# Patient Record
Sex: FEMALE | Race: WHITE
[De-identification: names, ages, dates, MRNs, and addresses within clinical notes are randomized per-mention and may not be internally consistent; named-entity substitution may affect disease eponyms.]

---

## 2016-12-31 NOTE — NUR
REC'D TO ROOM 2215 FROM ER DEPT A 50 Y/O W/FE PER SERVICES DR. MCKINNON WITH DX
RLL PNEUMONIA. ALLERGIC TO SULFA. IV PATENT RT HAND SALINE LOCK. O2 ON 2L/M
PER NC. ASSESSMENT AS PER ADMIT PACKET.

## 2017-01-01 NOTE — NUR
ASSESSMENT AS PER FLOWSHEET. IV PATENT RT HAND OF NS AT 50CC'S/HR SITE CLEAR.
PT C/O OF PAIN ANTIBIOTIC INFUSING AT SITE. CHECKED FOR PLACEMENT GOOD BLOOD
RETURN NOTED. CHECKED RESULTS OF XRAY PREVIOUSLY DONE ON WRIST. STATES NO
FRACTURE NOTED.

## 2017-01-01 NOTE — NUR
PATIENT RECEIVED ALERT IN RIGHT LATERAL POSITION. NO SIGNS OF DISTRESS NOTED.
SIDE RAILS UP X2. BED IN LOW POSITION. CALL LIGHT IN REACH. FAMILY AT BEDSIDE.

## 2017-01-01 NOTE — NUR
PT CRYING KLONOPIN GIVEN EARLY FOR ANXIETY. FRIEND AND FAMILY IN ROOM. IV
SALINE LOCKED SO PATIENT COULD TAKE A SHOWER. FRIEND HELPED.

## 2017-01-01 NOTE — NUR
PATIENT ALERT IN BED VISITING WITH GUESTS. NO SIGNS OF DISTRESS NOTED. DENIES
NEEDS. SIDE RAILS UP X2. BED IN LOW POSITION. CALL LIGHT IN REACH.

## 2017-01-01 NOTE — NUR
PATIENT ALERT IN BED. NO SIGNS OF DISTRESS NOTED. SCHEDULED MEDICATION
ADMINISTERED AS WELL AS PRN PERCOCET FOR PAIN 7/10. DENIES FURTHER NEEDS. SIDE
RAILS UP X2. BED IN LOW POSITION. CALL LIGHT IN REACH.

## 2017-01-02 NOTE — NUR
IV TO RIGHT FOREARM INFILTRATED. IV D/C WITH CATH TIP INTACT. MOTHER WANTING
PATIENT TO HAVE CENTRAL LINE. WILL SPEAK WITH PHYSICIAN.

## 2017-01-02 NOTE — NUR
PATIENT IN MID DIAZ POSITION RESTING WITH EYES CLOSED. RESPIRATIONS EVEN AND
UNLABORED. SIDE RAILS UP X2. BED IN LOW POSITION. CALL LIGHT IN REACH. GUEST
AT BEDSIDE.

## 2017-01-02 NOTE — NUR
22 GAUGE IV SITED TO RIGHT FOREARM X1 ATTEMPT. FLUSHES EASY WITH BRISK BLOOD
RETURN PRESENT. SECURED WITH TAPE AND TEGADERM. WELL TOLERATED.

## 2017-01-02 NOTE — NUR
PATIENT IN BED RESTING QUIETLY. NO SIGNS OF DISTRESS NOTED. SCHEDULED
MEDICATION ADMINISTERED. IV TO RIGHT FOREARM PATENT. FLUSHES EASY. NO REDNESS
OR INFLAMMATION NOTED TO SIDE. MOTHER AT BEDSIDE. SIDE RAILS UP X2. BED IN LOW
POSITION. CALL LIGHT IN REACH.

## 2017-01-02 NOTE — NUR
PATIENT ALERT IN BED. C/O PAIN TO RIGHT HAND/WRIST 9/10. 1 TAB PERCOCET
ADMINISTERED. ICE PACK PROVIDED. ELEVATED ON PILLOW. DENIES FURTHER NEEDS.
FAMILY PRESENT. SIDE RAILS UP X2. BED IN LOW POSITION. CALL LIGHT IN REACH.

## 2017-01-02 NOTE — NUR
PATIENT RECEIVED IN LOW DIAZ POSITION RESTING WITH EYES CLOSED. RESPIRATIONS
EVEN AND UNLABORED. SIDE RAILS UP X2. BED IN LOW POSITION. CALL LIGHT IN
REACH.

## 2017-01-02 NOTE — NUR
PATIENT ALERT IN HIGH DIAZ POSITION EATING BREAKFAST. TOLERATING WELL.
SCHEDULED MEDICATION ADMINISTERED. REFUSES TO HAVE IV RESITED. STATES RIGHT
ARM HURTS TOO BAD, AND PATIENT IS RESERVE LEFT ARM. SIDE RAILS UP X2. BED IN
LOW POSITION. CALL LIGHT IN REACH.

## 2017-01-02 NOTE — NUR
PATIENT IN HIGH DIAZ POSITION RESTING WITH EYES CLOSED. RESPIRATIONS EVEN
AND UNLABORED. SIDE RAILS UP X2. BED IN LOW POSITION. CALL LIGHT IN REACH.

## 2017-01-02 NOTE — NUR
Patient Name: ROCKY ADKINS                                   Admission
Status: ER
Accout number: [id number]                            Admission Date:
2016
: 1965                                                      Admission
Diagnosis:
Attending: EUSEBIA                                              Current
LOS:  2
 
Anticipated DC Date:
2017
Planned Disposition: Home with Home Health
Primary Insurance: MEDICARE A & B
 
 
Discharge Planning Comments:
CM SPOKE WITH PATIENT REGARDING D/C NEEDS AND PLANS. PATIENT STATED SHE LIVES
ALONE AND HER FRIEND (HENRI) WILL TRANSPORT HER HOME AT DISCHARGE. THERE ARE
3 STEPS W/O RAILS TO ENTER HOME AND NO STAIRS INSIDE. PATIENT STATED SHE IS
INDEPENDENT WITH HER CARE AND HAS A WALKER, CANE, AND SHOWER CHAIR AT HOME IF
NEEDED. PATIENTS PCP IS DR. COSTELLO AND USES [facility] [address] IF NEEDED.
PATIENT CHOSE [facility] AND SIGNED THE KRISTA FORM. CM WILL CONTINUE TO
FOLLOW PATIENT WITH D/C NEEDS AND PLANS.
 
 
PCP  DR. TRANG ZHANG [address]-  [phone]
ADRIANABEBE SALAZAR (MOM)  [phone]
 
 
 
 
 
: Tianna Macias
 
Is the patient Alert and Oriented? Yes  0
* How many steps to enter\exit or inside your home? 3 W/O RAIL  0
* PCP DR. COSTELLO  0
* Pharmacy [facility] [address]  0
* Preadmission Environment Home Alone  0
* ADLs Independent  0
* Equipment Cane
Shower Chair
Walker  0
* List name and contact numbers for known caregivers / representatives who
currently or will assist patient after discharge: ADRIAN SALAZAR ([facility])  [phone]  0
 
* Community resources currently utilized None  0
* Additional services required to return to the preadmission environment? Yes
0
* Can the patient safely return to the preadmission environment? Yes  0
* Has this patient been hospitalized within the prior 30 days at any hospital?
No  0
    Grand Total:  0

## 2017-01-03 NOTE — NUR
PATIENT IS GETTING AGGRAVATED. AWAITING TO GO HOME. RENAL DOCTOR JUST LEFT OUT
THE ROOM. PATIENT IS COMPLAINING ABOUT RENAL DIET AND WANTING TO LEAVE. ALERT
AND ORIENTED. UP WITH NO ASSISTANCE NEEDED. AWAITING DISCHARGE PAPERS.

## 2017-01-03 NOTE — NUR
D/C REASSESSMENT NOTE:  PATIENT IS DISCHARGING HOME WITH [facility]. EX
 (MOSHE ADKINS ) IS TRANSPORTING HER HOME.

## 2017-01-03 NOTE — NUR
ASSESSMENT COMPLETED. WAS ASLEEP WHEN I WALKED IN ROOM. DID NOT WAKE UP WHEN I
CALLED HER NAME A FEW TIMES BUT SHE AWAKENED AFTER SHAKING HER. STATES HER
PAIN IS A 9 AND SHE HADN'T HAD A PAIN PILL "IN A WHILE". I EXPLAINED TO
PATIENT THAT SHE WAS GIVEN ONE BY THE NIGHT SHIFT NURSE ABOUT AN HOUR AGO.
PATIENT THEN STATED "DOESN'T FEEL LIKE IT. I WANT TO GET THE HECK OUT OF HERE
AND GO HOME." EXPLAINED TO PATIENT THAT SHE HAS TO WAIT UNTIL THE DOCTOR SHOWS
UP AND EXAMINES HER AND GIVES THE DISCHARGE ORDER. AM MEDS ADMINISTERED.
REFUSES SCDs. CALL LIGHT IN REACH. WILL CONTINUE WITH PLAN OF CARE.

## 2017-01-03 NOTE — NUR
IN ROOM CURSING AND YELLING. WANTS DIFFERENT FOOD FOR EACH MEAL. TRIED
EXPLAINING TO PATIENT THAT SHE CAN ONLY HAVE CERTAIN FOODS D/T HER RENAL DIET
BUT SHE REFUSES TO LISTEN AND CONTINUES TO YELL AND CURSE.

## 2017-12-13 ENCOUNTER — HOSPITAL ENCOUNTER (EMERGENCY)
Dept: HOSPITAL 84 - D.ER | Age: 52
Discharge: HOME | End: 2017-12-13
Payer: MEDICARE

## 2017-12-13 VITALS — BODY MASS INDEX: 24.2 KG/M2

## 2017-12-13 DIAGNOSIS — S46.012A: Primary | ICD-10-CM
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
## 2017-12-13 DIAGNOSIS — J44.9: ICD-10-CM
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
## 2017-12-13 DIAGNOSIS — I10: ICD-10-CM

## 2018-09-07 ENCOUNTER — HOSPITAL ENCOUNTER (EMERGENCY)
Dept: HOSPITAL 84 - D.ER | Age: 53
Discharge: HOME | End: 2018-09-07
Payer: MEDICARE

## 2018-09-07 VITALS — BODY MASS INDEX: 30.6 KG/M2 | HEIGHT: 66 IN | WEIGHT: 190.4 LBS

## 2018-09-07 VITALS — SYSTOLIC BLOOD PRESSURE: 140 MMHG | DIASTOLIC BLOOD PRESSURE: 88 MMHG

## 2018-09-07 VITALS — DIASTOLIC BLOOD PRESSURE: 90 MMHG | SYSTOLIC BLOOD PRESSURE: 140 MMHG

## 2018-09-07 VITALS — BODY MASS INDEX: 30.6 KG/M2 | WEIGHT: 190.4 LBS | HEIGHT: 66 IN

## 2018-09-07 DIAGNOSIS — Z94.0: ICD-10-CM

## 2018-09-07 DIAGNOSIS — E07.9: ICD-10-CM
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
## 2018-09-07 DIAGNOSIS — S92.501A: ICD-10-CM

## 2018-09-07 DIAGNOSIS — M54.32: ICD-10-CM

## 2018-09-07 DIAGNOSIS — S91.114A: ICD-10-CM

## 2018-09-07 DIAGNOSIS — M54.5: Primary | ICD-10-CM
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
## 2018-09-07 DIAGNOSIS — I10: ICD-10-CM

## 2018-09-07 DIAGNOSIS — G47.419: ICD-10-CM

## 2018-09-07 DIAGNOSIS — N18.9: ICD-10-CM

## 2018-09-07 DIAGNOSIS — J44.9: ICD-10-CM
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
## 2018-09-07 DIAGNOSIS — F17.200: ICD-10-CM

## 2018-09-07 DIAGNOSIS — I12.9: ICD-10-CM

## 2018-09-07 LAB
ALBUMIN SERPL-MCNC: 3.6 G/DL (ref 3.4–5)
ALBUMIN SERPL-MCNC: 3.6 G/DL (ref 3.4–5)
ALP SERPL-CCNC: 51 U/L (ref 46–116)
ALP SERPL-CCNC: 55 U/L (ref 46–116)
ALT SERPL-CCNC: 135 U/L (ref 10–68)
ALT SERPL-CCNC: 136 U/L (ref 10–68)
AMPHETAMINES UR QL SCN: NEGATIVE QUAL
AMPHETAMINES UR QL SCN: NEGATIVE QUAL
ANION GAP SERPL CALC-SCNC: 11.4 MMOL/L (ref 8–16)
ANION GAP SERPL CALC-SCNC: 11.9 MMOL/L (ref 8–16)
APPEARANCE UR: (no result)
APPEARANCE UR: CLEAR
BACTERIA #/AREA URNS HPF: (no result) /HPF
BARBITURATES UR QL SCN: NEGATIVE QUAL
BARBITURATES UR QL SCN: NEGATIVE QUAL
BENZODIAZ UR QL SCN: NEGATIVE QUAL
BENZODIAZ UR QL SCN: NEGATIVE QUAL
BILIRUB SERPL-MCNC: 0.38 MG/DL (ref 0.2–1.3)
BILIRUB SERPL-MCNC: 0.47 MG/DL (ref 0.2–1.3)
BILIRUB SERPL-MCNC: NEGATIVE MG/DL
BILIRUB SERPL-MCNC: NEGATIVE MG/DL
BUN SERPL-MCNC: 17 MG/DL (ref 7–18)
BUN SERPL-MCNC: 19 MG/DL (ref 7–18)
BZE UR QL SCN: NEGATIVE QUAL
BZE UR QL SCN: NEGATIVE QUAL
CALCIUM SERPL-MCNC: 9.2 MG/DL (ref 8.5–10.1)
CALCIUM SERPL-MCNC: 9.3 MG/DL (ref 8.5–10.1)
CANNABINOIDS UR QL SCN: NEGATIVE QUAL
CANNABINOIDS UR QL SCN: NEGATIVE QUAL
CHLORIDE SERPL-SCNC: 107 MMOL/L (ref 98–107)
CHLORIDE SERPL-SCNC: 108 MMOL/L (ref 98–107)
CO2 SERPL-SCNC: 24.4 MMOL/L (ref 21–32)
CO2 SERPL-SCNC: 27.8 MMOL/L (ref 21–32)
COLOR UR: YELLOW
COLOR UR: YELLOW
CREAT SERPL-MCNC: 1.3 MG/DL (ref 0.6–1.3)
CREAT SERPL-MCNC: 1.4 MG/DL (ref 0.6–1.3)
CRP SERPL-MCNC: < 0.2 MG/DL (ref 0–0.9)
ERYTHROCYTE [DISTWIDTH] IN BLOOD BY AUTOMATED COUNT: 14.2 % (ref 11.5–14.5)
ERYTHROCYTE [DISTWIDTH] IN BLOOD BY AUTOMATED COUNT: 14.5 % (ref 11.5–14.5)
GLOBULIN SER-MCNC: 2.9 G/L
GLOBULIN SER-MCNC: 2.9 G/L
GLUCOSE SERPL-MCNC: 110 MG/DL (ref 74–106)
GLUCOSE SERPL-MCNC: 95 MG/DL (ref 74–106)
GLUCOSE SERPL-MCNC: NEGATIVE MG/DL
GLUCOSE SERPL-MCNC: NEGATIVE MG/DL
HCT VFR BLD CALC: 40.5 % (ref 36–48)
HCT VFR BLD CALC: 41.6 % (ref 36–48)
HGB BLD-MCNC: 13.5 G/DL (ref 12–16)
HGB BLD-MCNC: 13.7 G/DL (ref 12–16)
HYALINE CASTS #/AREA URNS LPF: (no result) /LPF
KETONES UR STRIP-MCNC: NEGATIVE MG/DL
KETONES UR STRIP-MCNC: NEGATIVE MG/DL
LIPASE SERPL-CCNC: 186 U/L (ref 73–393)
LYMPHOCYTES NFR BLD AUTO: 21.4 % (ref 15–50)
LYMPHOCYTES NFR BLD AUTO: 22.9 % (ref 15–50)
MCH RBC QN AUTO: 30.5 PG (ref 26–34)
MCH RBC QN AUTO: 30.9 PG (ref 26–34)
MCHC RBC AUTO-ENTMCNC: 32.9 G/DL (ref 31–37)
MCHC RBC AUTO-ENTMCNC: 33.3 G/DL (ref 31–37)
MCV RBC: 92.7 FL (ref 80–100)
MCV RBC: 92.7 FL (ref 80–100)
MUCOUS THREADS #/AREA URNS LPF: (no result) /LPF
NEUTROPHILS NFR BLD AUTO: 59.7 % (ref 40–80)
NEUTROPHILS NFR BLD AUTO: 62.5 % (ref 40–80)
NITRITE UR-MCNC: NEGATIVE MG/ML
NITRITE UR-MCNC: NEGATIVE MG/ML
OPIATES UR QL SCN: NEGATIVE QUAL
OPIATES UR QL SCN: NEGATIVE QUAL
OSMOLALITY SERPL CALC.SUM OF ELEC: 281 MOSM/KG (ref 275–300)
OSMOLALITY SERPL CALC.SUM OF ELEC: 284 MOSM/KG (ref 275–300)
PCP UR QL SCN: NEGATIVE QUAL
PCP UR QL SCN: NEGATIVE QUAL
PH UR STRIP: 5 [PH] (ref 5–6)
PH UR STRIP: 6 [PH] (ref 5–6)
PLATELET # BLD: 110 10X3/UL (ref 130–400)
PLATELET # BLD: 117 10X3/UL (ref 130–400)
PMV BLD AUTO: 9.7 FL (ref 7.4–10.4)
PMV BLD AUTO: 9.7 FL (ref 7.4–10.4)
POTASSIUM SERPL-SCNC: 4.2 MMOL/L (ref 3.5–5.1)
POTASSIUM SERPL-SCNC: 4.3 MMOL/L (ref 3.5–5.1)
PROT SERPL-MCNC: 6.5 G/DL (ref 6.4–8.2)
PROT SERPL-MCNC: 6.5 G/DL (ref 6.4–8.2)
PROT UR-MCNC: NEGATIVE MG/DL
PROT UR-MCNC: NEGATIVE MG/DL
RBC # BLD AUTO: 4.37 10X6/UL (ref 4–5.4)
RBC # BLD AUTO: 4.49 10X6/UL (ref 4–5.4)
SODIUM SERPL-SCNC: 140 MMOL/L (ref 136–145)
SODIUM SERPL-SCNC: 142 MMOL/L (ref 136–145)
SP GR UR STRIP: 1.01 (ref 1–1.02)
SP GR UR STRIP: 1.01 (ref 1–1.02)
SQUAMOUS #/AREA URNS HPF: (no result) /HPF (ref 0–5)
UROBILINOGEN UR-MCNC: NORMAL MG/DL
UROBILINOGEN UR-MCNC: NORMAL MG/DL
WBC # BLD AUTO: 6.6 10X3/UL (ref 4.8–10.8)
WBC # BLD AUTO: 6.7 10X3/UL (ref 4.8–10.8)
WBC #/AREA URNS HPF: (no result) /HPF (ref 0–5)

## 2022-06-01 NOTE — NUR
RXs GIVEN TO PATIENT. DC INSTRUCTIONS EXPLAINED TO PATIENT AND EX-.
VERBALIZED UNDERSTANDING. PATIENT STATES DR. POWER SAID HE WOULD GIVE HER A
WEEK'S WORTH OF KLONOPIN. I SPOKE WITH DR. POWER WHOM SAID HE WOULD. I WALKED
OVER TO [facility] TO GET IT AND BROUGHT IT BACK TO PATIENT. 74